# Patient Record
Sex: FEMALE | Race: WHITE | NOT HISPANIC OR LATINO | Employment: OTHER | URBAN - METROPOLITAN AREA
[De-identification: names, ages, dates, MRNs, and addresses within clinical notes are randomized per-mention and may not be internally consistent; named-entity substitution may affect disease eponyms.]

---

## 2023-04-04 ENCOUNTER — OFFICE VISIT (OUTPATIENT)
Dept: GASTROENTEROLOGY | Facility: CLINIC | Age: 38
End: 2023-04-04
Payer: COMMERCIAL

## 2023-04-04 ENCOUNTER — PREP FOR SURGERY (OUTPATIENT)
Dept: SURGERY | Facility: SURGERY CENTER | Age: 38
End: 2023-04-04
Payer: COMMERCIAL

## 2023-04-04 VITALS
TEMPERATURE: 98.4 F | SYSTOLIC BLOOD PRESSURE: 100 MMHG | OXYGEN SATURATION: 96 % | HEIGHT: 64 IN | WEIGHT: 205.3 LBS | BODY MASS INDEX: 35.05 KG/M2 | HEART RATE: 63 BPM | DIASTOLIC BLOOD PRESSURE: 70 MMHG

## 2023-04-04 DIAGNOSIS — R12 HEARTBURN: ICD-10-CM

## 2023-04-04 DIAGNOSIS — R19.4 CHANGE IN BOWEL HABITS: ICD-10-CM

## 2023-04-04 DIAGNOSIS — R07.89 ATYPICAL CHEST PAIN: ICD-10-CM

## 2023-04-04 DIAGNOSIS — R10.9 ABDOMINAL CRAMPING: ICD-10-CM

## 2023-04-04 DIAGNOSIS — R10.10 PAIN OF UPPER ABDOMEN: Primary | ICD-10-CM

## 2023-04-04 PROCEDURE — 99204 OFFICE O/P NEW MOD 45 MIN: CPT | Performed by: NURSE PRACTITIONER

## 2023-04-04 PROCEDURE — 1160F RVW MEDS BY RX/DR IN RCRD: CPT | Performed by: NURSE PRACTITIONER

## 2023-04-04 PROCEDURE — 1159F MED LIST DOCD IN RCRD: CPT | Performed by: NURSE PRACTITIONER

## 2023-04-04 RX ORDER — FAMOTIDINE 40 MG/1
40 TABLET, FILM COATED ORAL DAILY
Qty: 90 TABLET | Refills: 1 | Status: SHIPPED | OUTPATIENT
Start: 2023-04-04

## 2023-04-04 RX ORDER — VALACYCLOVIR HYDROCHLORIDE 500 MG/1
TABLET, FILM COATED ORAL
COMMUNITY
Start: 2023-02-24

## 2023-04-04 RX ORDER — SODIUM CHLORIDE 0.9 % (FLUSH) 0.9 %
3 SYRINGE (ML) INJECTION EVERY 12 HOURS SCHEDULED
OUTPATIENT
Start: 2023-04-04

## 2023-04-04 RX ORDER — SODIUM CHLORIDE 0.9 % (FLUSH) 0.9 %
10 SYRINGE (ML) INJECTION AS NEEDED
OUTPATIENT
Start: 2023-04-04

## 2023-04-04 RX ORDER — SODIUM CHLORIDE, SODIUM LACTATE, POTASSIUM CHLORIDE, CALCIUM CHLORIDE 600; 310; 30; 20 MG/100ML; MG/100ML; MG/100ML; MG/100ML
30 INJECTION, SOLUTION INTRAVENOUS CONTINUOUS PRN
OUTPATIENT
Start: 2023-04-04

## 2023-04-04 RX ORDER — DICYCLOMINE HYDROCHLORIDE 10 MG/1
10 CAPSULE ORAL 4 TIMES DAILY
Qty: 120 CAPSULE | Refills: 3 | Status: SHIPPED | OUTPATIENT
Start: 2023-04-04

## 2023-04-04 RX ORDER — HYDROCODONE BITARTRATE AND ACETAMINOPHEN 7.5; 325 MG/1; MG/1
TABLET ORAL
COMMUNITY
Start: 2023-03-28

## 2023-04-04 RX ORDER — VIBEGRON 75 MG/1
TABLET, FILM COATED ORAL
COMMUNITY
Start: 2023-03-08

## 2023-04-04 RX ORDER — GABAPENTIN 800 MG/1
TABLET ORAL
COMMUNITY
Start: 2023-03-28

## 2023-04-04 NOTE — PATIENT INSTRUCTIONS
Start pepcid for acid reflux    Start dicyclomine for abdominal cramping    Schedule evaluation of gallbladder    Schedule EGD and colonoscopy, orders placed.    Additional recommendations will be made based on results of EGD and colonoscopy findings.    Follow-up visit after procedures to discuss results and make any additional recommendations.

## 2023-04-04 NOTE — PROGRESS NOTES
"Chief Complaint   Patient presents with   • Abdominal Pain           History of Present Illness  37-year-old female presents today for irritable bowel syndrome and epigastric pain.  She is a new patient with our practice.  Her mother is a patient of our practice and follows with myself and Dr. Gilmore.    She presents today for longstanding GI symptoms.  She states the day back to childhood.  She has noticed worsening abdominal pain described as knots located in the mid upper abdomen.  Pain is worse with oral intake but can also occur without oral intake.  She has tried antispasmodic medication that belonged to her family member with improvement in symptoms in the past.  She has not been prescribed antispasmodic previously.    She will have chest pressure and gas in her throat, described as burning and fizzing sensation.  Heartburn is worse with large or normal-sized meals, she does not have heartburn or epigastric burning with snacks.    Bowel movements fluctuate, some days she will have 1-3 bowel movements other days she can have 4-7.  If she is eating poorly she can have up to 15 bowel movements per day.  She denies melena or hematochezia.    She does report worsening symptoms with stress and anxiety.    No previous EGD or colonoscopy.    She is currently using Gaviscon and Tums as needed for acid reflux.  No prior use of acid medication regularly.    She Has had partial hysterectomy and continues to struggle with weight loss although she is actively working on this with dietary and exercise modifications.    She has had previous evaluation of her gallbladder years ago.    Review of Systems      Result Review :           Vital Signs:   /70   Pulse 63   Temp 98.4 °F (36.9 °C)   Ht 162.6 cm (64\")   Wt 93.1 kg (205 lb 4.8 oz)   SpO2 96%   BMI 35.24 kg/m²     Body mass index is 35.24 kg/m².     Physical Exam  Vitals reviewed.   Constitutional:       General: She is not in acute distress.     Appearance: " Normal appearance. She is not ill-appearing.   Abdominal:      General: Bowel sounds are normal. There is no distension.      Palpations: Abdomen is soft.      Tenderness: There is abdominal tenderness (Epigastric and periumbilical tenderness with light palpation). There is no guarding.   Neurological:      Mental Status: She is alert.       Assessment and Plan    Diagnoses and all orders for this visit:    1. Pain of upper abdomen (Primary)  -     dicyclomine (BENTYL) 10 MG capsule; Take 1 capsule by mouth 4 (Four) Times a Day. For abdominal cramping  Dispense: 120 capsule; Refill: 3  -     US Abdomen Limited; Future  -     NM HIDA SCAN WITH PHARMACOLOGICAL INTERVENTION; Future    2. Abdominal cramping  -     dicyclomine (BENTYL) 10 MG capsule; Take 1 capsule by mouth 4 (Four) Times a Day. For abdominal cramping  Dispense: 120 capsule; Refill: 3    3. Atypical chest pain  -     famotidine (Pepcid) 40 MG tablet; Take 1 tablet by mouth Daily.  Dispense: 90 tablet; Refill: 1  -     US Abdomen Limited; Future  -     NM HIDA SCAN WITH PHARMACOLOGICAL INTERVENTION; Future    4. Heartburn  -     famotidine (Pepcid) 40 MG tablet; Take 1 tablet by mouth Daily.  Dispense: 90 tablet; Refill: 1    5. Change in bowel habits       Recommend EGD and colonoscopy for further evaluation.    Recommend evaluation of gallbladder, orders placed for HIDA scan and right upper quadrant ultrasound.    Suspicion for IBS with worsening anxiety and stress.    We will start dicyclomine up to 4 times daily.    Recommend avoiding foods that worsen GI symptoms.    For acid reflux, will start Pepcid 40 mg once daily, okay to continue Gaviscon and Tums as needed.    Recommend follow-up visit after EGD, colonoscopy, HIDA scan and ultrasound have been performed for additional medication recommendations and review of testing and any further recommendations.    Patient verbalized agreement and understanding with the above plan, all questions  answered.          Patient Instructions   Start pepcid for acid reflux    Start dicyclomine for abdominal cramping    Schedule evaluation of gallbladder    Schedule EGD and colonoscopy, orders placed.    Additional recommendations will be made based on results of EGD and colonoscopy findings.    Follow-up visit after procedures to discuss results and make any additional recommendations.           EMR Dragon/Transcription Disclaimer:  This document has been Dictated utilizing Dragon dictation.

## 2023-04-25 ENCOUNTER — HOSPITAL ENCOUNTER (OUTPATIENT)
Dept: NUCLEAR MEDICINE | Facility: HOSPITAL | Age: 38
Discharge: HOME | End: 2023-04-25
Payer: COMMERCIAL

## 2023-04-25 ENCOUNTER — HOSPITAL ENCOUNTER (OUTPATIENT)
Dept: ULTRASOUND IMAGING | Facility: HOSPITAL | Age: 38
Discharge: HOME | End: 2023-04-25
Admitting: NURSE PRACTITIONER
Payer: COMMERCIAL

## 2023-04-25 DIAGNOSIS — R10.10 PAIN OF UPPER ABDOMEN: ICD-10-CM

## 2023-04-25 DIAGNOSIS — R07.89 ATYPICAL CHEST PAIN: ICD-10-CM

## 2023-04-25 PROCEDURE — A9537 TC99M MEBROFENIN: HCPCS | Performed by: NURSE PRACTITIONER

## 2023-04-25 PROCEDURE — 25010000002 SINCALIDE PER 5 MCG: Performed by: NURSE PRACTITIONER

## 2023-04-25 PROCEDURE — 78227 HEPATOBIL SYST IMAGE W/DRUG: CPT

## 2023-04-25 PROCEDURE — 76705 ECHO EXAM OF ABDOMEN: CPT

## 2023-04-25 PROCEDURE — 0 TECHNETIUM TC 99M MEBROFENIN KIT: Performed by: NURSE PRACTITIONER

## 2023-04-25 RX ORDER — KIT FOR THE PREPARATION OF TECHNETIUM TC 99M MEBROFENIN 45 MG/10ML
1 INJECTION, POWDER, LYOPHILIZED, FOR SOLUTION INTRAVENOUS
Status: COMPLETED | OUTPATIENT
Start: 2023-04-25 | End: 2023-04-25

## 2023-04-25 RX ADMIN — SODIUM CHLORIDE 1.9 MCG: 9 INJECTION, SOLUTION INTRAVENOUS at 08:53

## 2023-04-25 RX ADMIN — MEBROFENIN 1 DOSE: 45 INJECTION, POWDER, LYOPHILIZED, FOR SOLUTION INTRAVENOUS at 07:10

## 2023-05-08 ENCOUNTER — TELEPHONE (OUTPATIENT)
Dept: GASTROENTEROLOGY | Facility: CLINIC | Age: 38
End: 2023-05-08
Payer: COMMERCIAL

## 2023-06-06 ENCOUNTER — HOSPITAL ENCOUNTER (OUTPATIENT)
Facility: SURGERY CENTER | Age: 38
Setting detail: HOSPITAL OUTPATIENT SURGERY
Discharge: HOME | End: 2023-06-06
Attending: INTERNAL MEDICINE | Admitting: INTERNAL MEDICINE
Payer: COMMERCIAL

## 2023-06-06 ENCOUNTER — ANESTHESIA EVENT (OUTPATIENT)
Dept: SURGERY | Facility: SURGERY CENTER | Age: 38
End: 2023-06-06
Payer: COMMERCIAL

## 2023-06-06 ENCOUNTER — ANESTHESIA (OUTPATIENT)
Dept: SURGERY | Facility: SURGERY CENTER | Age: 38
End: 2023-06-06
Payer: COMMERCIAL

## 2023-06-06 VITALS
BODY MASS INDEX: 33.63 KG/M2 | OXYGEN SATURATION: 98 % | DIASTOLIC BLOOD PRESSURE: 78 MMHG | RESPIRATION RATE: 16 BRPM | SYSTOLIC BLOOD PRESSURE: 112 MMHG | TEMPERATURE: 98 F | HEIGHT: 64 IN | HEART RATE: 95 BPM | WEIGHT: 197 LBS

## 2023-06-06 DIAGNOSIS — R19.4 CHANGE IN BOWEL HABITS: ICD-10-CM

## 2023-06-06 DIAGNOSIS — R10.9 ABDOMINAL CRAMPING: ICD-10-CM

## 2023-06-06 DIAGNOSIS — R12 HEARTBURN: ICD-10-CM

## 2023-06-06 DIAGNOSIS — R07.89 ATYPICAL CHEST PAIN: ICD-10-CM

## 2023-06-06 DIAGNOSIS — R10.10 PAIN OF UPPER ABDOMEN: ICD-10-CM

## 2023-06-06 PROCEDURE — 25010000002 PROPOFOL 10 MG/ML EMULSION: Performed by: ANESTHESIOLOGY

## 2023-06-06 PROCEDURE — 45385 COLONOSCOPY W/LESION REMOVAL: CPT | Performed by: INTERNAL MEDICINE

## 2023-06-06 PROCEDURE — 43239 EGD BIOPSY SINGLE/MULTIPLE: CPT | Performed by: INTERNAL MEDICINE

## 2023-06-06 PROCEDURE — 45380 COLONOSCOPY AND BIOPSY: CPT | Performed by: INTERNAL MEDICINE

## 2023-06-06 PROCEDURE — 88305 TISSUE EXAM BY PATHOLOGIST: CPT | Performed by: INTERNAL MEDICINE

## 2023-06-06 PROCEDURE — 25010000002 PROPOFOL 1000 MG/100ML EMULSION: Performed by: ANESTHESIOLOGY

## 2023-06-06 RX ORDER — SODIUM CHLORIDE, SODIUM LACTATE, POTASSIUM CHLORIDE, CALCIUM CHLORIDE 600; 310; 30; 20 MG/100ML; MG/100ML; MG/100ML; MG/100ML
30 INJECTION, SOLUTION INTRAVENOUS CONTINUOUS PRN
Status: DISCONTINUED | OUTPATIENT
Start: 2023-06-06 | End: 2023-06-06 | Stop reason: HOSPADM

## 2023-06-06 RX ORDER — PROPOFOL 10 MG/ML
INJECTION, EMULSION INTRAVENOUS AS NEEDED
Status: DISCONTINUED | OUTPATIENT
Start: 2023-06-06 | End: 2023-06-06 | Stop reason: SURG

## 2023-06-06 RX ORDER — SODIUM CHLORIDE 0.9 % (FLUSH) 0.9 %
10 SYRINGE (ML) INJECTION AS NEEDED
Status: DISCONTINUED | OUTPATIENT
Start: 2023-06-06 | End: 2023-06-06 | Stop reason: HOSPADM

## 2023-06-06 RX ORDER — LIDOCAINE HYDROCHLORIDE 20 MG/ML
INJECTION, SOLUTION INFILTRATION; PERINEURAL AS NEEDED
Status: DISCONTINUED | OUTPATIENT
Start: 2023-06-06 | End: 2023-06-06 | Stop reason: SURG

## 2023-06-06 RX ORDER — SODIUM CHLORIDE 0.9 % (FLUSH) 0.9 %
3 SYRINGE (ML) INJECTION EVERY 12 HOURS SCHEDULED
Status: DISCONTINUED | OUTPATIENT
Start: 2023-06-06 | End: 2023-06-06 | Stop reason: HOSPADM

## 2023-06-06 RX ADMIN — LIDOCAINE HYDROCHLORIDE 50 MG: 20 INJECTION, SOLUTION INFILTRATION; PERINEURAL at 15:01

## 2023-06-06 RX ADMIN — PROPOFOL 150 MG: 10 INJECTION, EMULSION INTRAVENOUS at 15:01

## 2023-06-06 RX ADMIN — PROPOFOL 200 MCG/KG/MIN: 10 INJECTION, EMULSION INTRAVENOUS at 15:01

## 2023-06-06 RX ADMIN — SODIUM CHLORIDE, POTASSIUM CHLORIDE, SODIUM LACTATE AND CALCIUM CHLORIDE 30 ML/HR: 600; 310; 30; 20 INJECTION, SOLUTION INTRAVENOUS at 13:53

## 2023-06-06 NOTE — ANESTHESIA POSTPROCEDURE EVALUATION
"Patient: Roberta Blake    Procedure Summary       Date: 06/06/23 Room / Location: [facility]    Anesthesia Start: 1456 Anesthesia Stop: 1528    Procedures:       ESOPHAGOGASTRODUODENOSCOPY      COLONOSCOPY to cecum with Polypectomy Diagnosis:       Pain of upper abdomen      Abdominal cramping      Atypical chest pain      Heartburn      Change in bowel habits      (Pain of upper abdomen [R10.10])      (Abdominal cramping [R10.9])      (Atypical chest pain [R07.89])      (Heartburn [R12])      (Change in bowel habits [R19.4])    Surgeons: Sha Gilmore MD Provider: Esteban Ramírez DO    Anesthesia Type: MAC ASA Status: 2            Anesthesia Type: MAC    Vitals  Vitals Value Taken Time   /78 06/06/23 1545   Temp     Pulse 95 06/06/23 1545   Resp 16 06/06/23 1545   SpO2 98 % 06/06/23 1545           Post Anesthesia Care and Evaluation    Patient location during evaluation: bedside  Patient participation: complete - patient participated  Level of consciousness: awake and alert  Pain management: adequate    Airway patency: patent  Anesthetic complications: No anesthetic complications  PONV Status: controlled  Cardiovascular status: acceptable and hemodynamically stable  Respiratory status: acceptable, spontaneous ventilation and nonlabored ventilation  Hydration status: acceptable    Comments: /78   Pulse 95   Temp 36.7 °C (98 °F) (Temporal)   Resp 16   Ht 162.6 cm (64\")   Wt 89.4 kg (197 lb)   SpO2 98%   BMI 33.81 kg/m²       "

## 2023-06-06 NOTE — ANESTHESIA PREPROCEDURE EVALUATION
Anesthesia Evaluation     Patient summary reviewed   no history of anesthetic complications:   NPO Solid Status: > 8 hours  NPO Liquid Status: > 2 hours           Airway   Mallampati: II  TM distance: >3 FB  Neck ROM: full  No difficulty expected  Dental - normal exam     Pulmonary     breath sounds clear to auscultation  (+) a smoker Former,  (-) shortness of breath, recent URI  Cardiovascular   Exercise tolerance: good (4-7 METS)    Rhythm: regular  Rate: normal    (-) past MI, dysrhythmias, angina      Neuro/Psych  (-) seizures, CVA  GI/Hepatic/Renal/Endo    (+) obesity, GERD  (-) no renal disease, diabetes    ROS Comment: IBS and epigastric pain    Musculoskeletal     (+) back pain (nerve), chronic pain, neck pain  (-) neck stiffness  Abdominal    Substance History   (+) drug use (reports marijuana use)     OB/GYN          Other                        Anesthesia Plan    ASA 2     MAC     (MAC anesthesia discussed with patient and/or patient representative. Risks (including but not limited to intra-op awareness), benefits, and alternatives were discussed. Understanding was voiced with an agreement to proceed with a MAC technique and General as a backup option. )    Anesthetic plan, risks, benefits, and alternatives have been provided, discussed and informed consent has been obtained with: patient.      CODE STATUS:

## 2023-06-06 NOTE — H&P
No chief complaint on file.      HPI  Patient today for an EGD due to epigastric pain and GERD and a colonoscopy for change in bowel habits and diarrhea.         Problem List:    Patient Active Problem List   Diagnosis    Pain of upper abdomen    Abdominal cramping    Atypical chest pain    Heartburn    Change in bowel habits       Medical History:    Past Medical History:   Diagnosis Date    Cervical disc disease     Chronic back pain     Nerve pain     right side of body        Social History:    Social History     Socioeconomic History    Marital status: Single   Tobacco Use    Smoking status: Former     Types: Cigarettes     Quit date: 2020     Years since quittin.6    Smokeless tobacco: Never   Vaping Use    Vaping Use: Never used   Substance and Sexual Activity    Alcohol use: Yes     Comment: rarely    Drug use: Yes     Types: Marijuana     Comment: rarely    Sexual activity: Defer       Family History:   Family History   Problem Relation Age of Onset    Irritable bowel syndrome Mother     Colon polyps Mother     Irritable bowel syndrome Maternal Grandmother     Irritable bowel syndrome Paternal Grandmother     Colon cancer Neg Hx     Crohn's disease Neg Hx     Ulcerative colitis Neg Hx        Surgical History:   Past Surgical History:   Procedure Laterality Date    BLADDER REPAIR      CERVICAL CONE BIOPSY      CYST REMOVAL      labial    HYSTERECTOMY      ROTATOR CUFF REPAIR      TONSILLECTOMY AND ADENOIDECTOMY         No current facility-administered medications for this encounter.    Allergies:   Allergies   Allergen Reactions    Co-Trimoxazole [Sulfamethoxazole-Trimethoprim] Hives and Swelling    Latex Rash        The following portions of the patient's history were reviewed by me and updated as appropriate: review of systems, allergies, current medications, past family history, past medical history, past social history, past surgical history and problem list.    There were no vitals filed for  this visit.    PHYSICAL EXAM:    CONSTITUTIONAL:  today's vital signs reviewed by me  GASTROINTESTINAL: abdomen is soft nontender nondistended with normal active bowel sounds, no masses are appreciated    Assessment/ Plan  We will proceed today with EGD and colonoscopy.    Risks and benefits as well as alternatives to endoscopic evaluation were explained to the patient and they voiced understanding and wish to proceed.  These risks include but are not limited to the risk of bleeding, perforation, adverse reaction to sedation, and missed lesions.  The patient was given the opportunity to ask questions prior to the endoscopic procedure.

## 2023-06-08 LAB
LAB AP CASE REPORT: NORMAL
PATH REPORT.FINAL DX SPEC: NORMAL
PATH REPORT.GROSS SPEC: NORMAL

## 2023-06-13 ENCOUNTER — OFFICE VISIT (OUTPATIENT)
Dept: GASTROENTEROLOGY | Facility: CLINIC | Age: 38
End: 2023-06-13
Payer: COMMERCIAL

## 2023-06-13 VITALS
OXYGEN SATURATION: 98 % | SYSTOLIC BLOOD PRESSURE: 110 MMHG | DIASTOLIC BLOOD PRESSURE: 70 MMHG | BODY MASS INDEX: 33.63 KG/M2 | TEMPERATURE: 96.9 F | HEART RATE: 97 BPM | WEIGHT: 197 LBS | HEIGHT: 64 IN

## 2023-06-13 DIAGNOSIS — R10.9 ABDOMINAL CRAMPING: Primary | ICD-10-CM

## 2023-06-13 DIAGNOSIS — K44.9 HIATAL HERNIA: ICD-10-CM

## 2023-06-13 DIAGNOSIS — R12 HEARTBURN: ICD-10-CM

## 2023-06-13 PROCEDURE — 99214 OFFICE O/P EST MOD 30 MIN: CPT | Performed by: NURSE PRACTITIONER

## 2023-06-13 PROCEDURE — 1160F RVW MEDS BY RX/DR IN RCRD: CPT | Performed by: NURSE PRACTITIONER

## 2023-06-13 PROCEDURE — 1159F MED LIST DOCD IN RCRD: CPT | Performed by: NURSE PRACTITIONER

## 2023-06-13 RX ORDER — PANTOPRAZOLE SODIUM 40 MG/1
40 TABLET, DELAYED RELEASE ORAL DAILY
Qty: 90 TABLET | Refills: 3 | Status: SHIPPED | OUTPATIENT
Start: 2023-06-13

## 2023-06-13 RX ORDER — DICYCLOMINE HCL 20 MG
20 TABLET ORAL 3 TIMES DAILY
Qty: 90 TABLET | Refills: 5 | Status: SHIPPED | OUTPATIENT
Start: 2023-06-13

## 2023-06-13 NOTE — PATIENT INSTRUCTIONS
Reviewed EGD, colonoscopy, HIDA scan and right upper quadrant ultrasound.    No evidence of celiac disease or microscopic colitis.    Recommend colon cancer screening at age 45.    Increase dicyclomine to 20 mg three times per day    Start pantoprazole 40 mg once daily for acid reflux  Ok to use pepcid as needed    For Hiatal hernia and GERD, follow antireflux precautions.    Recommend avoiding eating within 3 to 4 hours of bedtime.    Avoid foods that can trigger symptoms which may include citrus fruits, spicy foods, tomatoes and red sauces, chocolate, coffee/tea, caffeinated or carbonated beverages, alcohol.    If urgency occurs after after eating despite higher dose of dicyclomine, please call the office for additional recommendations    Avoid foods that worsen symptoms    Follow up in 6 months, sooner if symptoms change or condition warrants.

## 2023-06-13 NOTE — PROGRESS NOTES
"Chief Complaint   Patient presents with    Follow-up     Review recent EGD, colonoscopy, HIDA scan and ultrasound           History of Present Illness  37-year-old female presents today for follow-up.    Initial consult April 4, 2023 for IBS and epigastric pain.  EGD and colonoscopy performed June 6, 2023.  HIDA scan April 25, 2023, ejection fraction 87%.  Right upper quadrant ultrasound April 25, 2023 with mild hepatomegaly with questionable mild hepatic steatosis, no cholelithiasis or biliary ductal dilation.    She will experience abdominal knots located in mid abdomen, worse with oral intake.  She was started on dicyclomine at her initial consult.  Seh takes dicyclomine three times per day, cramping is less but still present.     She has post prandial urgency after eating, not diarrhea.   She will have 2-5 BM per day.     For atypical chest pain Pepcid 40 mg once daily was prescribed at her initial consult.  She did not notice this helped, she will have reflux with liquid in supine position.   She will have epigastric abdominal pain.   This occurs with any foods, no specific food triggers.     She intermittent fasts and avoids eating a couple hours before bed.     Review of Systems      Result Review :       US Abdomen Limited (04/25/2023 07:04)  Tissue Pathology Exam (06/06/2023 15:04)  COLONOSCOPY (06/06/2023 14:51)  UPPER GI ENDOSCOPY (06/06/2023 14:54)  NM HIDA SCAN WITH PHARMACOLOGICAL INTERVENTION (04/25/2023 09:38)    Vital Signs:   /70   Pulse 97   Temp 96.9 °F (36.1 °C)   Ht 162.6 cm (64.02\")   Wt 89.4 kg (197 lb)   SpO2 98%   BMI 33.80 kg/m²     Body mass index is 33.8 kg/m².     Physical Exam  Vitals reviewed.   Constitutional:       General: She is not in acute distress.     Appearance: Normal appearance. She is well-developed. She is not ill-appearing, toxic-appearing or diaphoretic.   Eyes:      General: No scleral icterus.  Pulmonary:      Effort: Pulmonary effort is normal. No " respiratory distress.   Skin:     Coloration: Skin is not jaundiced or pale.   Neurological:      Mental Status: She is alert.   Psychiatric:         Mood and Affect: Mood normal.         Behavior: Behavior normal.         Thought Content: Thought content normal.         Judgment: Judgment normal.         Assessment and Plan    Diagnoses and all orders for this visit:    1. Abdominal cramping (Primary)  -     dicyclomine (BENTYL) 20 MG tablet; Take 1 tablet by mouth 3 (Three) Times a Day.  Dispense: 90 tablet; Refill: 5    2. Heartburn    3. Hiatal hernia  -     pantoprazole (PROTONIX) 40 MG EC tablet; Take 1 tablet by mouth Daily.  Dispense: 90 tablet; Refill: 3       Reviewed EGD, colonoscopy, HIDA scan and right upper quadrant ultrasound.    No evidence of celiac disease or microscopic colitis.    Recommend colon cancer screening at age 45.    Increase dicyclomine to 20 mg three times per day    Start pantoprazole 40 mg once daily for acid reflux  Ok to use pepcid as needed    For Hiatal hernia and GERD, follow antireflux precautions.    Recommend avoiding eating within 3 to 4 hours of bedtime.    Avoid foods that can trigger symptoms which may include citrus fruits, spicy foods, tomatoes and red sauces, chocolate, coffee/tea, caffeinated or carbonated beverages, alcohol.    If urgency occurs after after eating despite higher dose of dicyclomine, please call the office for additional recommendations    Avoid foods that worsen symptoms    Follow up in 6 months, sooner if symptoms change or condition warrants.      To consider colestipol low dose, xifaxan, imodium        Patient Instructions   Reviewed EGD, colonoscopy, HIDA scan and right upper quadrant ultrasound.    No evidence of celiac disease or microscopic colitis.    Recommend colon cancer screening at age 45.    Increase dicyclomine to 20 mg three times per day    Start pantoprazole 40 mg once daily for acid reflux  Ok to use pepcid as needed    For Hiatal  hernia and GERD, follow antireflux precautions.    Recommend avoiding eating within 3 to 4 hours of bedtime.    Avoid foods that can trigger symptoms which may include citrus fruits, spicy foods, tomatoes and red sauces, chocolate, coffee/tea, caffeinated or carbonated beverages, alcohol.    If urgency occurs after after eating despite higher dose of dicyclomine, please call the office for additional recommendations    Avoid foods that worsen symptoms    Follow up in 6 months, sooner if symptoms change or condition warrants.        EMR Dragon/Transcription Disclaimer:  This document has been Dictated utilizing Dragon dictation.

## 2023-10-03 DIAGNOSIS — R12 HEARTBURN: ICD-10-CM

## 2023-10-03 DIAGNOSIS — R07.89 ATYPICAL CHEST PAIN: ICD-10-CM

## 2023-10-03 RX ORDER — FAMOTIDINE 40 MG/1
TABLET, FILM COATED ORAL
Qty: 90 TABLET | Refills: 1 | Status: SHIPPED | OUTPATIENT
Start: 2023-10-03

## 2023-12-11 ENCOUNTER — OFFICE VISIT (OUTPATIENT)
Dept: GASTROENTEROLOGY | Facility: CLINIC | Age: 38
End: 2023-12-11
Payer: COMMERCIAL

## 2023-12-11 VITALS
WEIGHT: 228.6 LBS | SYSTOLIC BLOOD PRESSURE: 118 MMHG | DIASTOLIC BLOOD PRESSURE: 74 MMHG | TEMPERATURE: 97.1 F | BODY MASS INDEX: 39.03 KG/M2 | HEART RATE: 97 BPM | OXYGEN SATURATION: 98 % | HEIGHT: 64 IN

## 2023-12-11 DIAGNOSIS — R10.9 ABDOMINAL CRAMPING: ICD-10-CM

## 2023-12-11 DIAGNOSIS — K44.9 HIATAL HERNIA: ICD-10-CM

## 2023-12-11 PROCEDURE — 99214 OFFICE O/P EST MOD 30 MIN: CPT | Performed by: NURSE PRACTITIONER

## 2023-12-11 PROCEDURE — 1159F MED LIST DOCD IN RCRD: CPT | Performed by: NURSE PRACTITIONER

## 2023-12-11 PROCEDURE — 1160F RVW MEDS BY RX/DR IN RCRD: CPT | Performed by: NURSE PRACTITIONER

## 2023-12-11 RX ORDER — PHENTERMINE HYDROCHLORIDE 37.5 MG/1
37.5 TABLET ORAL DAILY
COMMUNITY
Start: 2023-12-08 | End: 2024-03-07

## 2023-12-11 RX ORDER — ISOTRETINOIN 30 MG/1
CAPSULE, LIQUID FILLED ORAL
COMMUNITY
Start: 2023-11-09

## 2023-12-11 RX ORDER — BROMPHENIRAMINE MALEATE, PSEUDOEPHEDRINE HYDROCHLORIDE, AND DEXTROMETHORPHAN HYDROBROMIDE 2; 30; 10 MG/5ML; MG/5ML; MG/5ML
10 SYRUP ORAL
COMMUNITY
Start: 2023-12-11

## 2023-12-11 RX ORDER — METHOCARBAMOL 750 MG/1
TABLET, FILM COATED ORAL
COMMUNITY
Start: 2023-08-29

## 2023-12-11 RX ORDER — AMOXICILLIN AND CLAVULANATE POTASSIUM 875; 125 MG/1; MG/1
1 TABLET, FILM COATED ORAL
COMMUNITY
Start: 2023-12-11 | End: 2023-12-18

## 2023-12-11 RX ORDER — DICYCLOMINE HCL 20 MG
20 TABLET ORAL 3 TIMES DAILY
Qty: 90 TABLET | Refills: 5 | Status: SHIPPED | OUTPATIENT
Start: 2023-12-11

## 2023-12-11 RX ORDER — PANTOPRAZOLE SODIUM 40 MG/1
40 TABLET, DELAYED RELEASE ORAL DAILY
Qty: 90 TABLET | Refills: 3 | Status: SHIPPED | OUTPATIENT
Start: 2023-12-11

## 2023-12-11 RX ORDER — CYANOCOBALAMIN 1000 UG/ML
1000 INJECTION, SOLUTION INTRAMUSCULAR; SUBCUTANEOUS
COMMUNITY
Start: 2023-09-12

## 2023-12-11 NOTE — PATIENT INSTRUCTIONS
Keep track of bowel movements, cram[ping, whether you feel empty of not, episodes of diarrhea, use of dicyclomine on blank calendar and track for 3-4 week.   Send this information via [employer] Week of January 2 for review    Continue dicyclomine at this time as needed    Continue pantoprazole daily

## 2023-12-11 NOTE — PROGRESS NOTES
"Chief Complaint   Patient presents with    Follow-up     Abdominal cramping           History of Present Illness  38-year-old female presents today for follow-up.    Initial consult April 4, 2023 for IBS and epigastric pain.  Last office visit June 13, 2023.  EGD and colonoscopy performed June 6, 2023.  HIDA scan April 25, 2023, ejection fraction 87%.  Right upper quadrant ultrasound April 25, 2023 with mild hepatomegaly with questionable mild hepatic steatosis, no cholelithiasis or biliary ductal dilation.     She will experience abdominal knots located in mid abdomen, worse with oral intake.  She was started on dicyclomine at her initial consult and the dose was increased to 20 mg 3 times daily at her last office visit.    She has post prandial urgency after eating, not diarrhea.   She will have 2-5 BM per day.   She can have cramping that comes and goes, three bad days per week, no specific pattern.   No certain food triggers that she is aware of.   She will take 2-3 dicyclomine to help with cramping as needed.   Not taking daily at this time.    She will take Norco as needed for pain.  Also on Gemtesa and she is wondering if this is contributing to constipation.  She has recently been prescribed phentermine but has not picked up the prescription yet.      Pantoprazole is working for acid reflux and atypical chest pain.       Vital Signs:   /74   Pulse 97   Temp 97.1 °F (36.2 °C)   Ht 162.6 cm (64\")   Wt 104 kg (228 lb 9.6 oz)   SpO2 98%   BMI 39.24 kg/m²     Body mass index is 39.24 kg/m².     Physical Exam  Vitals reviewed.   Constitutional:       General: She is not in acute distress.     Appearance: Normal appearance. She is not ill-appearing.   Eyes:      General: No scleral icterus.  Pulmonary:      Effort: Pulmonary effort is normal. No respiratory distress.   Abdominal:      General: Bowel sounds are normal. There is no distension.      Palpations: Abdomen is soft. Abdomen is not rigid. There " is no pulsatile mass.      Tenderness: There is no abdominal tenderness. There is no guarding or rebound.   Skin:     Coloration: Skin is not jaundiced.   Neurological:      Mental Status: She is alert and oriented to person, place, and time.   Psychiatric:         Thought Content: Thought content normal.         Judgment: Judgment normal.       Assessment and Plan    Diagnoses and all orders for this visit:    1. Hiatal hernia  -     pantoprazole (PROTONIX) 40 MG EC tablet; Take 1 tablet by mouth Daily.  Dispense: 90 tablet; Refill: 3    2. Abdominal cramping  -     dicyclomine (BENTYL) 20 MG tablet; Take 1 tablet by mouth 3 (Three) Times a Day.  Dispense: 90 tablet; Refill: 5       Unclear exact cause of abdominal cramping, wonder if there is a component of incomplete evacuation and then she will have episodes with increased cramping abdominal pain that improve after a couple of bowel movements however she is unclear the exact pattern.  I would like for patient to track her symptoms for the next 3 weeks and send blank calendar and update to the office the first week of January so that we can review.  Also discussed possible side effects of medication that can include constipation or affect bowel movements.    To consider low-dose MiraLAX, to consider fiber supplement, will continue dicyclomine at this time.    Atypical chest pain, hiatal hernia, symptoms improved with pantoprazole, refill provided.    Additional recommendations will be made based on clinical course.          Patient Instructions   Keep track of bowel movements, cram[ping, whether you feel empty of not, episodes of diarrhea, use of dicyclomine on blank calendar and track for 3-4 week.   Send this information via [employer] Week of January 2 for review    Continue dicyclomine at this time as needed    Continue pantoprazole daily            EMR Dragon/Transcription Disclaimer:  This document has been Dictated utilizing Dragon dictation.

## 2024-03-31 DIAGNOSIS — R12 HEARTBURN: ICD-10-CM

## 2024-03-31 DIAGNOSIS — R07.89 ATYPICAL CHEST PAIN: ICD-10-CM

## 2024-04-01 RX ORDER — FAMOTIDINE 40 MG/1
40 TABLET, FILM COATED ORAL DAILY
Qty: 90 TABLET | Refills: 1 | OUTPATIENT
Start: 2024-04-01

## 2024-06-14 ENCOUNTER — TELEPHONE (OUTPATIENT)
Dept: GASTROENTEROLOGY | Facility: CLINIC | Age: 39
End: 2024-06-14
Payer: COMMERCIAL

## 2024-06-14 DIAGNOSIS — K59.04 CHRONIC IDIOPATHIC CONSTIPATION: Primary | ICD-10-CM

## 2024-06-14 NOTE — TELEPHONE ENCOUNTER
Provider: CLAYTON LINDA     Caller: FARTUN CHEEK     Relationship to Patient: SELF    Pharmacy: [facility]    Phone Number: [phone]    Reason for Call: PT WOULD LIKE CLAYTON LINDA TO ORDER HER SOME MIRALAX PT STATED THEY HAD DISCUSSED THIS BEFORE AND SHE HAD FORGOTTEN ABOUT IT UNTIL SHE HAD TROUBLE HAVING A BM ALSO SHE WAS WANTING TO KNOW ABOUT IF FIBER NEEDED TO BE ADDED PLEASE ADVISE AND CALL PT BACK

## 2024-06-16 RX ORDER — POLYETHYLENE GLYCOL 3350 17 G/17G
17 POWDER, FOR SOLUTION ORAL DAILY
Qty: 510 G | Refills: 5 | Status: SHIPPED | OUTPATIENT
Start: 2024-06-16

## 2024-06-16 NOTE — TELEPHONE ENCOUNTER
Please let patient know that I sent in a prescription for MiraLAX 1 capful daily.  She should start with one half capful daily and keep track of her response and increase up to 1 capful daily as needed to help promote more complete and regular bowel movements.      Fiber supplement can be purchased over-the-counter if she would like to also add this, Recommend FiberCon tablets.  The instructions for fiber tablet are below but I would recommend that she start with MiraLAX and add FiberCon if she still needs help with bowel movements and is taking 1 full capful of MiraLAX, instructions are below.    This can be purchased over-the-counter, generic brand is fine.  Fiber supplements can take 12 to 72 hours to start working.  Start fiber supplement 2 per day and gradually increase based on  recommended daily amount if needed based on your response.    Drink 6 to 12 ounces of water or noncarbonated beverage with fiber supplement.    Mark

## 2024-07-15 ENCOUNTER — TELEPHONE (OUTPATIENT)
Dept: GASTROENTEROLOGY | Facility: CLINIC | Age: 39
End: 2024-07-15

## 2024-07-15 NOTE — TELEPHONE ENCOUNTER
HUB STAFF ATTEMPTED TO FOLLOW WARM TRANSFER PROCESS BUT WAS UNSUCCESSFUL.     Caller: Roberta Blake    Relationship to patient: Self    Best call back number: [phone]    Chief complaint: CHANGE OFFICE VISIT TO TELEHEALTH     Type of visit: OFFICE VISIT     If rescheduling, when is the original appointment: 07/17/24     Additional notes: PATIENT HAS A FAMILY EMERGENCY AND HAS TO LEAVE TOWN. SHE IS WANTING TO SEE ABOUT SWITCHING HER OFFICE VISIT ON 07/17/24 TO A TELEHEALTH VISIT.

## 2024-08-12 ENCOUNTER — OFFICE VISIT (OUTPATIENT)
Dept: GASTROENTEROLOGY | Facility: CLINIC | Age: 39
End: 2024-08-12
Payer: COMMERCIAL

## 2024-08-12 VITALS
WEIGHT: 204.4 LBS | HEART RATE: 100 BPM | TEMPERATURE: 97.6 F | DIASTOLIC BLOOD PRESSURE: 78 MMHG | HEIGHT: 64 IN | SYSTOLIC BLOOD PRESSURE: 126 MMHG | OXYGEN SATURATION: 97 % | BODY MASS INDEX: 34.89 KG/M2

## 2024-08-12 DIAGNOSIS — R10.9 ABDOMINAL CRAMPING: ICD-10-CM

## 2024-08-12 DIAGNOSIS — K44.9 HIATAL HERNIA: ICD-10-CM

## 2024-08-12 DIAGNOSIS — R12 HEARTBURN: ICD-10-CM

## 2024-08-12 DIAGNOSIS — K59.04 CHRONIC IDIOPATHIC CONSTIPATION: Primary | ICD-10-CM

## 2024-08-12 DIAGNOSIS — K58.1 IRRITABLE BOWEL SYNDROME WITH CONSTIPATION: ICD-10-CM

## 2024-08-12 PROCEDURE — 99214 OFFICE O/P EST MOD 30 MIN: CPT | Performed by: NURSE PRACTITIONER

## 2024-08-12 PROCEDURE — 1159F MED LIST DOCD IN RCRD: CPT | Performed by: NURSE PRACTITIONER

## 2024-08-12 PROCEDURE — 1160F RVW MEDS BY RX/DR IN RCRD: CPT | Performed by: NURSE PRACTITIONER

## 2024-08-12 RX ORDER — PHENTERMINE HYDROCHLORIDE 37.5 MG/1
37.5 TABLET ORAL DAILY
COMMUNITY
Start: 2024-05-24 | End: 2024-08-22

## 2024-08-12 RX ORDER — PANTOPRAZOLE SODIUM 40 MG/1
40 TABLET, DELAYED RELEASE ORAL DAILY
Qty: 90 TABLET | Refills: 3 | Status: SHIPPED | OUTPATIENT
Start: 2024-08-12

## 2024-08-12 RX ORDER — POLYETHYLENE GLYCOL 3350 17 G/17G
17 POWDER, FOR SOLUTION ORAL DAILY
COMMUNITY

## 2024-08-12 RX ORDER — MELOXICAM 15 MG/1
15 TABLET ORAL DAILY
COMMUNITY

## 2024-08-12 NOTE — PATIENT INSTRUCTIONS
For constipation, it may take trying several different medications to find the right medication regimen to help regulate your bowel movements.    You have been given samples of different medications and different dosages to see which medication works best to promote more regular bowel movements with complete evacuation.  Please follow these instructions regarding the samples.  Side effects of medications for constipation include nausea, abdominal pain, headache, diarrhea, cramping and in rare cases severe diarrhea.  The symptoms may be present when you first start the medication and then improve after several doses or may persist and become intolerable.  If any of the medications provided because prolonged and intolerable side effects, discontinue and contact the office.    FIRST Start Linzess 145  mcg.  BAG ONE  Take 1 tablet daily 30 minutes before first meal of the day.    If you do not notice more regular complete bowel movements,   STOP Linzess 145 mcg and START Linzess 290 mcg.  BAG TWO  Take 1 tablet daily 30 minutes before first meal of the day.      Please contact the office with an update and we will send in medication that works the best to your pharmacy for regular use.    Miralax therapeutic bowel prep recommended for clean out- 5-7 capfuls of miralax at one time, over a couple of hours if your schedule allows for therapeutic clean out then start prescription samples for constipation,     Ok to use miralax with samples/combo therapy is sometimes needed      Use lowest dose of zofran possible to control symptoms as this can worsen constipation    If nausea worsens, consider discussing with [facility] prescriber as you may need to hold dose/adjust for side effects    Continue dicyclomine     Continue pantoprazole    Follow up visit in 6-7 months

## 2024-08-12 NOTE — PROGRESS NOTES
"Chief Complaint   Patient presents with    Follow-up     Constipation           History of Present Illness  38-year-old female presents today for follow-up.  Initial consult April 4, 2023 for IBS and epigastric pain.  Last office visit December 11, 2023.  Most recent EGD and colonoscopy June 6, 2023.  HIDA scan April 2023, ejection fraction 87%.  Right upper quadrant ultrasound April 25, 2023 with mild hepatomegaly with questionable mild hepatic steatosis, no cholelithiasis or biliary ductal dilation.    She has longstanding acid reflux, this is controlled with pantoprazole.  This also works well to control atypical chest pain.    She presents today with change in bowel habits with constipation.  Symptoms started approximately 3 to 4 weeks ago.  Her typical bowel pattern is postprandial urgency with loose stools, approximately 2-5 bowel movements per day.  She is now having constipation and can go several days without a spontaneous bowel movement.  No rectal bleeding or black stools.    She uses dicyclomine for abdominal cramping and discomfort as needed.    She has been experiencing intermittent nausea and is taking Zofran as needed.    She has started Wegovy several weeks ago.     Vital Signs:   /78   Pulse 100   Temp 97.6 °F (36.4 °C)   Ht 162.6 cm (64\")   Wt 92.7 kg (204 lb 6.4 oz)   SpO2 97%   BMI 35.09 kg/m²     Body mass index is 35.09 kg/m².     Physical Exam  Vitals reviewed.   Constitutional:       General: She is not in acute distress.     Appearance: Normal appearance. She is not ill-appearing or toxic-appearing.   Eyes:      General: No scleral icterus.  Pulmonary:      Effort: Pulmonary effort is normal. No respiratory distress.   Skin:     Coloration: Skin is not jaundiced.   Neurological:      Mental Status: She is alert and oriented to person, place, and time.   Psychiatric:         Mood and Affect: Mood normal.         Behavior: Behavior normal.         Thought Content: Thought content " normal.         Judgment: Judgment normal.       Assessment and Plan    Diagnoses and all orders for this visit:    1. Chronic idiopathic constipation (Primary)  -     linaclotide (Linzess) 145 MCG capsule capsule; Take 1 capsule by mouth Every Morning Before Breakfast.  Dispense: 12 capsule; Refill: 0    2. Irritable bowel syndrome with constipation  -     linaclotide (Linzess) 290 MCG capsule capsule; Take 1 capsule by mouth Every Morning Before Breakfast.  Dispense: 12 capsule; Refill: 0    3. Heartburn  -     pantoprazole (PROTONIX) 40 MG EC tablet; Take 1 tablet by mouth Daily.  Dispense: 90 tablet; Refill: 3    4. Abdominal cramping    5. Hiatal hernia  -     pantoprazole (PROTONIX) 40 MG EC tablet; Take 1 tablet by mouth Daily.  Dispense: 90 tablet; Refill: 3       Change in bowel habits with constipation.  There was suspicion for incomplete evacuation and some overflow diarrhea in the past but bowel habits have worsened with constipation predominantly.  Discussed expectations with constipation treatment including that it may take several different medications to find the right medication, it may take combination therapy to find the right medication and patient is agreeable and verbalized understanding with the process.  She was given samples of Linzess 145 mcg and Linzess 290 mcg with written instructions.  She will start a MiraLAX therapeutic bowel prep to clean out colon prior to starting back #1, Linzess 145 mcg.    Discussed intermittent nausea, recommend lowest dose of Zofran possible to control symptoms as this can worsen constipation.    Also discussed if nausea worsens, she may need to discuss side effect with her prescriber as this can be a common side effect of Wegovy.    Continue dicyclomine for abdominal cramping and urgency.    Acid reflux and atypical chest pain, stable, continue pantoprazole.    Follow-up visit in 6 to 7 months, sooner if symptoms change or condition  warrants.            Patient Instructions   For constipation, it may take trying several different medications to find the right medication regimen to help regulate your bowel movements.    You have been given samples of different medications and different dosages to see which medication works best to promote more regular bowel movements with complete evacuation.  Please follow these instructions regarding the samples.  Side effects of medications for constipation include nausea, abdominal pain, headache, diarrhea, cramping and in rare cases severe diarrhea.  The symptoms may be present when you first start the medication and then improve after several doses or may persist and become intolerable.  If any of the medications provided because prolonged and intolerable side effects, discontinue and contact the office.    FIRST Start Linzess 145  mcg.  BAG ONE  Take 1 tablet daily 30 minutes before first meal of the day.    If you do not notice more regular complete bowel movements,   STOP Linzess 145 mcg and START Linzess 290 mcg.  BAG TWO  Take 1 tablet daily 30 minutes before first meal of the day.      Please contact the office with an update and we will send in medication that works the best to your pharmacy for regular use.    Miralax therapeutic bowel prep recommended for clean out- 5-7 capfuls of miralax at one time, over a couple of hours if your schedule allows for therapeutic clean out then start prescription samples for constipation,     Ok to use miralax with samples/combo therapy is sometimes needed      Use lowest dose of zofran possible to control symptoms as this can worsen constipation    If nausea worsens, consider discussing with Wegovy prescriber as you may need to hold dose/adjust for side effects    Continue dicyclomine     Continue pantoprazole    Follow up visit in 6-7 months              EMR Dragon/Transcription Disclaimer:  This document has been Dictated utilizing Dragon dictation.

## 2025-05-29 ENCOUNTER — OFFICE VISIT (OUTPATIENT)
Dept: GASTROENTEROLOGY | Facility: CLINIC | Age: 40
End: 2025-05-29
Payer: COMMERCIAL

## 2025-05-29 VITALS
TEMPERATURE: 98 F | HEART RATE: 90 BPM | DIASTOLIC BLOOD PRESSURE: 78 MMHG | WEIGHT: 205.1 LBS | HEIGHT: 64 IN | SYSTOLIC BLOOD PRESSURE: 108 MMHG | BODY MASS INDEX: 35.01 KG/M2 | OXYGEN SATURATION: 98 %

## 2025-05-29 DIAGNOSIS — K44.9 HIATAL HERNIA: ICD-10-CM

## 2025-05-29 DIAGNOSIS — R12 HEARTBURN: ICD-10-CM

## 2025-05-29 PROCEDURE — 1160F RVW MEDS BY RX/DR IN RCRD: CPT | Performed by: NURSE PRACTITIONER

## 2025-05-29 PROCEDURE — 99214 OFFICE O/P EST MOD 30 MIN: CPT | Performed by: NURSE PRACTITIONER

## 2025-05-29 PROCEDURE — 1159F MED LIST DOCD IN RCRD: CPT | Performed by: NURSE PRACTITIONER

## 2025-05-29 RX ORDER — TRETINOIN 0.1 MG/G
GEL TOPICAL NIGHTLY
COMMUNITY

## 2025-05-29 RX ORDER — PANTOPRAZOLE SODIUM 40 MG/1
40 TABLET, DELAYED RELEASE ORAL DAILY
Qty: 90 TABLET | Refills: 3 | Status: SHIPPED | OUTPATIENT
Start: 2025-05-29

## 2025-05-29 NOTE — PATIENT INSTRUCTIONS
Continue dicyclomine for abdominal cramping and urgency.     Acid reflux and atypical chest pain, stable, continue pantoprazole 40 mg daily, discussed lower dose as the goal is lowest dose possible to control symptoms- patient wants to continue current dose at this time, will reassess at follow up visit    Continue miralax as needed    Continue dietary changes and healthier eating for GI symptoms as this has provided much improvement in symptoms    Follow up 9 months, sooner if symptoms change or condition warrants  Use lowest dose of zofran sparingly

## 2025-05-29 NOTE — PROGRESS NOTES
Chief Complaint   Patient presents with    Follow-up     Chronic constipation             GASTROENTEROLOGY SUMMARY    39 year-old female presents today for follow-up.  She has a history of IBS, constipation, atypical chest pain, acid reflux.   Last office visit 8/12/2024.  Most recent EGD and colonoscopy June 6, 2023.  HIDA scan April 2023, ejection fraction 87%.  RUQ U/S April 25, 2023 with mild hepatomegaly with questionable mild hepatic steatosis, no cholelithiasis or biliary ductal dilation.     History of Present Illness  The patient is a 39-year-old female who presents today for follow-up.   Her last visit was on 08/12/2024.    Constipation and Bowel Movements  She reports an improvement in her constipation since discontinuing Zofran and starting anjel supplements, as advised during her last visit.   She has not utilized the Linzess samples provided.   Her bowel movements are regular, with occasional feelings of incomplete evacuation.   Consumption of deep-fried foods or those cooked in peanut oil results in immediate bowel movements, while a diet of salads, fruits, vegetables, tuna, and chicken does not cause any stomach issues.   She occasionally uses MiraLAX when she feels bloated or constipated.  - Onset: Improvement since discontinuing Zofran and starting anjel supplements.  - Character: Regular bowel movements with occasional feelings of incomplete evacuation.  - Alleviating/Aggravating Factors: Deep-fried foods or peanut oil cause immediate bowel movements; salads, fruits, vegetables, tuna, and chicken do not cause issues; occasional use of MiraLAX for bloating or constipation.    Intermittent Abdominal Pain  She experiences intermittent abdominal pain, which she attributes to poor dietary choices.   She has not experienced any nausea, which she believes is due to the anjel supplements.   She takes dicyclomine 3 to 4 times a month for severe stomach cramping.  - Character: Intermittent abdominal pain  attributed to poor dietary choices.  - Alleviating Factors: Ania supplements prevent nausea; dicyclomine taken 3 to 4 times a month for severe stomach cramping.    Acid Reflux and Hiatal Hernia  She continues to take pantoprazole 40 mg for acid reflux, which she finds effective when she maintains a healthy diet.   She has a hiatal hernia.  - Alleviating Factors: Pantoprazole 40 mg effective with a healthy diet.    Weight Management Issues  She discontinued Wegovy in 11/2024 due to concerns raised by her weight loss doctor.   She subsequently started a weight loss program involving supplements, mineral drops, and mineral water, which initially resulted in weight loss but was followed by weight gain.   She currently weighs 205 pounds, up from a low of 187 pounds. She reports feeling bloated and retaining water weight. She is no longer taking phentermine.  - Onset: Discontinued Wegovy in 11/2024.  - Character: Initial weight loss followed by weight gain; feeling bloated and retaining water weight.  - Alleviating/Aggravating Factors: Weight loss program involving supplements, mineral drops, and mineral water.    She had a partial hysterectomy in 2021 and is up to date with her female exams.    PAST SURGICAL HISTORY:  Partial hysterectomy in 2021    SOCIAL HISTORY  Diet: Eats salad, fruit, veggies, tuna fish, chicken, and occasionally soda. Avoids deep-fried foods and peanut oil due to stomach issues. Sometimes uses MiraLAX mixed with chocolate milk or coffee.  Tobacco: Quit smoking 5 years ago.  Coffee/Tea/Caffeine-containing Drinks: Drinks coffee occasionally, mostly cream.     Result Review :         REVIEWED PERTINENT RESULTS/ LABS  Lab Results   Component Value Date    CASEREPORT  06/06/2023     Surgical Pathology Report                         Case: [id number]                                  Authorizing Provider:  Sha Gilmore MD    Collected:           06/06/2023 03:04 PM          Ordering Location:      [facility] SURGERY     Received:            06/06/2023 04:00 PM                                 [facility] MAIN OR                                                     Pathologist:           Kayy De Jesus MD                                                          Specimens:   1) - Small Intestine, small bowel biopsy to rule out celiac                                         2) - Gastric, Antrum, random gastric biopsy                                                         3) - GE Junction, Ge Junction biopsy for esophagitis                                                4) - Large Intestine, Right / Ascending Colon, random right colon biopsy                            5) - Large Intestine, Left / Descending Colon, random left colon biopsy                             6) - Large Intestine, Sigmoid Colon, sigmoid polyp                                         FINALDX  06/06/2023     1. Small Bowel, Biopsy: Benign small bowel mucosa with   A.  Normal intact villous surface.   B.  No significant inflammation, no granulomas.   C.  No viral inclusions or other organisms on routinely stained sections.    2. Stomach, Biopsy: Oxyntic type gastric mucosa with    A. No significant histopathology.   B. No H. pylori-like organisms identified.    3. Gastroesophageal Junction, Biopsy: Squamoglandular mucosa and submucosa with    A. No definitive well developed goblet cell metaplasia identified by routine staining.   B. No dysplasia nor malignancy.   C. No significant eosinophilia.   D. No viral inclusions nor fungal organisms identified.    4. Colon, Right, Biopsy: Benign colonic mucosa with   A. No hyperplastic or tubulovillous change.   B. No significant inflammation.   C. No crypt distortion or basement membrane thickening.   D. No viral inclusions or other organisms on routinely stained sections.    5. Colon, Left, Biopsy: Benign colonic mucosa with   A. No hyperplastic or tubulovillous change.   B. No significant  "inflammation.   C. No crypt distortion or basement membrane thickening.   D. No viral inclusions or other organisms on routinely stained sections.    6. Colon, Sigmoid, Biopsy:    A. Hyperplastic polyp.     [facility]/pkm        Lab Results   Component Value Date    HGB 13.9 09/07/2023    MCV 99.3 09/07/2023     09/07/2023    ALT 16 05/20/2024    AST 25 12/14/2023    INR 1.0 10/11/2021    TRIG 200 (H) 05/20/2024    IRON 114 03/11/2022    TIBC 310 03/11/2022        Vital Signs:   /78   Pulse 90   Temp 98 °F (36.7 °C)   Ht 162.6 cm (64\")   Wt 93 kg (205 lb 1.6 oz)   SpO2 98%   BMI 35.21 kg/m²     Body mass index is 35.21 kg/m².     Physical Exam  Vitals reviewed.   Constitutional:       General: She is not in acute distress.     Appearance: Normal appearance. She is not ill-appearing or toxic-appearing.   Eyes:      General: No scleral icterus.  Pulmonary:      Effort: Pulmonary effort is normal. No respiratory distress.   Skin:     Coloration: Skin is not jaundiced.   Neurological:      Mental Status: She is alert and oriented to person, place, and time.   Psychiatric:         Mood and Affect: Mood normal.         Behavior: Behavior normal.         Thought Content: Thought content normal.         Judgment: Judgment normal.         Assessment and Plan        Diagnoses and all orders for this visit:    1. Heartburn  -     pantoprazole (PROTONIX) 40 MG EC tablet; Take 1 tablet by mouth Daily.  Dispense: 90 tablet; Refill: 3    2. Hiatal hernia  -     pantoprazole (PROTONIX) 40 MG EC tablet; Take 1 tablet by mouth Daily.  Dispense: 90 tablet; Refill: 3       Assessment & Plan  1. Constipation, improved:  - Significant improvement since discontinuing Zofran and starting ginger supplements.  - Continue using ginger supplements for nausea management.  - Use MiraLAX as needed for constipation relief.  - She was previously given samples of Linzess, she has not needed them and is doing well in regards to bowel " movements, discussed possible relation to discontinuing Wegovy with improvement in nausea as well as bowel movements, will continue to monitor    2. Abdominal cramping:  - Experiences abdominal cramping three to four times a month.  - Use dicyclomine as needed for abdominal cramping.    3. Acid reflux and hiatal hernia:  - Continue with pantoprazole 40 mg for acid reflux and hiatal hernia management.  - Consider reducing the dosage to 20 mg after the holiday season.  - Goal is lowest dose of acid medication possible to control symptoms, if dose is decreased and symptoms return, would restart 40 mg daily and reassess use of lower dose in the future.  Dietary and lifestyle modifications for acid reflux strongly encouraged    4.  Nausea  This was daily and patient was taking several doses of Zofran on a regular basis to control symptoms exacerbating constipation  She has discontinued Zofran, started ginger supplement daily for nausea and is doing well  Discussed possible improvement as well since she has discontinued Wegovy however patient reports improvement with the addition of ginger prior to discontinuing Wegovy  Will continue to monitor    5. Weight management:  - Discontinued Wegovy and not taking phentermine.  - Maintain a healthy diet and continue weight loss efforts.    Follow-up:  - 02/2026             Patient Instructions   Continue dicyclomine for abdominal cramping and urgency.     Acid reflux and atypical chest pain, stable, continue pantoprazole 40 mg daily, discussed lower dose as the goal is lowest dose possible to control symptoms- patient wants to continue current dose at this time, will reassess at follow up visit    Continue miralax as needed    Continue dietary changes and healthier eating for GI symptoms as this has provided much improvement in symptoms    Follow up 9 months, sooner if symptoms change or condition warrants  Use lowest dose of zofran sparingly          Patient or patient  representative verbalized consent for the use of Ambient Listening during the visit with  ALEXEI Lazaro for chart documentation. 5/30/2025  17:10 EDT    EMR Dragon/Transcription Disclaimer:  This document has been Dictated utilizing Dragon dictation.

## (undated) DEVICE — Device

## (undated) DEVICE — KT ORCA ORCAPOD DISP STRL

## (undated) DEVICE — SINGLE-USE BIOPSY FORCEPS: Brand: RADIAL JAW 4

## (undated) DEVICE — THE SINGLE USE ETRAP – POLYP TRAP IS USED FOR SUCTION RETRIEVAL OF ENDOSCOPICALLY REMOVED POLYPS.: Brand: ETRAP

## (undated) DEVICE — LASSO POLYPECTOMY SNARE: Brand: LASSO

## (undated) DEVICE — BITEBLOCK OMNI BLOC

## (undated) DEVICE — GOWN ,SIRUS,NONREINFORCED 3XL: Brand: MEDLINE

## (undated) DEVICE — MSK ENDO PORT O2 POM ELITE CURAPLEX A/

## (undated) DEVICE — GOWN ISOL W/THUMB UNIV BLU BX/15

## (undated) DEVICE — SYRINGE, LUER SLIP, STERILE, 60ML: Brand: MEDLINE

## (undated) DEVICE — VIAL FORMLN CAP 10PCT 20ML

## (undated) DEVICE — FLEX ADVANTAGE 1500CC: Brand: FLEX ADVANTAGE

## (undated) DEVICE — CANN NASL CO2 TRULINK W/O2 A/